# Patient Record
Sex: FEMALE | Race: WHITE | NOT HISPANIC OR LATINO | Employment: OTHER | URBAN - METROPOLITAN AREA
[De-identification: names, ages, dates, MRNs, and addresses within clinical notes are randomized per-mention and may not be internally consistent; named-entity substitution may affect disease eponyms.]

---

## 2022-06-01 ENCOUNTER — HOSPITAL ENCOUNTER (EMERGENCY)
Facility: HOSPITAL | Age: 22
Discharge: HOME | End: 2022-06-01
Admitting: PHYSICIAN ASSISTANT
Payer: OTHER GOVERNMENT

## 2022-06-01 ENCOUNTER — APPOINTMENT (OUTPATIENT)
Dept: RADIOLOGY | Facility: HOSPITAL | Age: 22
End: 2022-06-01
Attending: EMERGENCY MEDICINE
Payer: OTHER GOVERNMENT

## 2022-06-01 VITALS
HEART RATE: 100 BPM | WEIGHT: 135 LBS | SYSTOLIC BLOOD PRESSURE: 118 MMHG | HEIGHT: 62 IN | OXYGEN SATURATION: 96 % | TEMPERATURE: 98.5 F | BODY MASS INDEX: 24.84 KG/M2 | RESPIRATION RATE: 20 BRPM | DIASTOLIC BLOOD PRESSURE: 67 MMHG

## 2022-06-01 DIAGNOSIS — J06.9 VIRAL URI WITH COUGH: ICD-10-CM

## 2022-06-01 DIAGNOSIS — J45.21 MILD INTERMITTENT ASTHMA WITH EXACERBATION: ICD-10-CM

## 2022-06-01 LAB
ALBUMIN SERPL-MCNC: 4.3 G/DL (ref 3.5–5)
ALP SERPL-CCNC: 78 U/L (ref 45–120)
ALT SERPL W P-5'-P-CCNC: 16 U/L (ref 0–45)
ANION GAP SERPL CALCULATED.3IONS-SCNC: 11 MMOL/L (ref 5–18)
AST SERPL W P-5'-P-CCNC: 17 U/L (ref 0–40)
BASOPHILS # BLD AUTO: 0 10E3/UL (ref 0–0.2)
BASOPHILS NFR BLD AUTO: 0 %
BILIRUB SERPL-MCNC: 1.4 MG/DL (ref 0–1)
BUN SERPL-MCNC: 10 MG/DL (ref 8–22)
CALCIUM SERPL-MCNC: 9.6 MG/DL (ref 8.5–10.5)
CHLORIDE BLD-SCNC: 101 MMOL/L (ref 98–107)
CO2 SERPL-SCNC: 23 MMOL/L (ref 22–31)
CREAT SERPL-MCNC: 0.76 MG/DL (ref 0.6–1.1)
EOSINOPHIL # BLD AUTO: 0.4 10E3/UL (ref 0–0.7)
EOSINOPHIL NFR BLD AUTO: 3 %
ERYTHROCYTE [DISTWIDTH] IN BLOOD BY AUTOMATED COUNT: 12 % (ref 10–15)
FLUAV RNA SPEC QL NAA+PROBE: NEGATIVE
FLUBV RNA RESP QL NAA+PROBE: NEGATIVE
GFR SERPL CREATININE-BSD FRML MDRD: >90 ML/MIN/1.73M2
GLUCOSE BLD-MCNC: 100 MG/DL (ref 70–125)
HCT VFR BLD AUTO: 46.7 % (ref 35–47)
HGB BLD-MCNC: 15.5 G/DL (ref 11.7–15.7)
IMM GRANULOCYTES # BLD: 0.1 10E3/UL
IMM GRANULOCYTES NFR BLD: 0 %
LIPASE SERPL-CCNC: <9 U/L (ref 0–52)
LYMPHOCYTES # BLD AUTO: 1.2 10E3/UL (ref 0.8–5.3)
LYMPHOCYTES NFR BLD AUTO: 8 %
MCH RBC QN AUTO: 28.9 PG (ref 26.5–33)
MCHC RBC AUTO-ENTMCNC: 33.2 G/DL (ref 31.5–36.5)
MCV RBC AUTO: 87 FL (ref 78–100)
MONOCYTES # BLD AUTO: 1.5 10E3/UL (ref 0–1.3)
MONOCYTES NFR BLD AUTO: 10 %
NEUTROPHILS # BLD AUTO: 12.5 10E3/UL (ref 1.6–8.3)
NEUTROPHILS NFR BLD AUTO: 79 %
NRBC # BLD AUTO: 0 10E3/UL
NRBC BLD AUTO-RTO: 0 /100
PLATELET # BLD AUTO: 277 10E3/UL (ref 150–450)
POTASSIUM BLD-SCNC: 4.2 MMOL/L (ref 3.5–5)
PROT SERPL-MCNC: 8.5 G/DL (ref 6–8)
RBC # BLD AUTO: 5.36 10E6/UL (ref 3.8–5.2)
SARS-COV-2 RNA RESP QL NAA+PROBE: NEGATIVE
SODIUM SERPL-SCNC: 135 MMOL/L (ref 136–145)
WBC # BLD AUTO: 15.6 10E3/UL (ref 4–11)

## 2022-06-01 PROCEDURE — 83690 ASSAY OF LIPASE: CPT | Performed by: EMERGENCY MEDICINE

## 2022-06-01 PROCEDURE — 71046 X-RAY EXAM CHEST 2 VIEWS: CPT

## 2022-06-01 PROCEDURE — 87636 SARSCOV2 & INF A&B AMP PRB: CPT | Performed by: EMERGENCY MEDICINE

## 2022-06-01 PROCEDURE — 80053 COMPREHEN METABOLIC PANEL: CPT | Performed by: EMERGENCY MEDICINE

## 2022-06-01 PROCEDURE — 99284 EMERGENCY DEPT VISIT MOD MDM: CPT | Mod: CS,25

## 2022-06-01 PROCEDURE — 36415 COLL VENOUS BLD VENIPUNCTURE: CPT | Performed by: EMERGENCY MEDICINE

## 2022-06-01 PROCEDURE — 85025 COMPLETE CBC W/AUTO DIFF WBC: CPT | Performed by: EMERGENCY MEDICINE

## 2022-06-01 PROCEDURE — 82040 ASSAY OF SERUM ALBUMIN: CPT | Performed by: EMERGENCY MEDICINE

## 2022-06-01 PROCEDURE — 250N000011 HC RX IP 250 OP 636: Performed by: EMERGENCY MEDICINE

## 2022-06-01 PROCEDURE — 96361 HYDRATE IV INFUSION ADD-ON: CPT

## 2022-06-01 PROCEDURE — C9803 HOPD COVID-19 SPEC COLLECT: HCPCS

## 2022-06-01 PROCEDURE — 250N000013 HC RX MED GY IP 250 OP 250 PS 637: Performed by: EMERGENCY MEDICINE

## 2022-06-01 PROCEDURE — 96374 THER/PROPH/DIAG INJ IV PUSH: CPT

## 2022-06-01 PROCEDURE — 258N000003 HC RX IP 258 OP 636: Performed by: EMERGENCY MEDICINE

## 2022-06-01 RX ORDER — SODIUM CHLORIDE 9 MG/ML
INJECTION, SOLUTION INTRAVENOUS CONTINUOUS
Status: DISCONTINUED | OUTPATIENT
Start: 2022-06-01 | End: 2022-06-01 | Stop reason: HOSPADM

## 2022-06-01 RX ORDER — KETOROLAC TROMETHAMINE 30 MG/ML
15 INJECTION, SOLUTION INTRAMUSCULAR; INTRAVENOUS ONCE
Status: COMPLETED | OUTPATIENT
Start: 2022-06-01 | End: 2022-06-01

## 2022-06-01 RX ORDER — ACETAMINOPHEN 325 MG/1
650 TABLET ORAL ONCE
Status: COMPLETED | OUTPATIENT
Start: 2022-06-01 | End: 2022-06-01

## 2022-06-01 RX ORDER — ONDANSETRON 2 MG/ML
4 INJECTION INTRAMUSCULAR; INTRAVENOUS EVERY 30 MIN PRN
Status: DISCONTINUED | OUTPATIENT
Start: 2022-06-01 | End: 2022-06-01 | Stop reason: HOSPADM

## 2022-06-01 RX ORDER — PREDNISONE 20 MG/1
TABLET ORAL
Qty: 10 TABLET | Refills: 0 | Status: SHIPPED | OUTPATIENT
Start: 2022-06-01

## 2022-06-01 RX ADMIN — ACETAMINOPHEN 650 MG: 325 TABLET ORAL at 16:36

## 2022-06-01 RX ADMIN — SODIUM CHLORIDE 1000 ML: 9 INJECTION, SOLUTION INTRAVENOUS at 16:40

## 2022-06-01 RX ADMIN — KETOROLAC TROMETHAMINE 15 MG: 30 INJECTION, SOLUTION INTRAMUSCULAR at 16:36

## 2022-06-01 ASSESSMENT — ENCOUNTER SYMPTOMS
COUGH: 1
HEMATURIA: 0
FREQUENCY: 0
WHEEZING: 1
FEVER: 0
NAUSEA: 1
FATIGUE: 1
DYSURIA: 0
CHEST TIGHTNESS: 1
VOMITING: 1
ABDOMINAL PAIN: 0
MYALGIAS: 1
SHORTNESS OF BREATH: 1
DIARRHEA: 0
HEADACHES: 1

## 2022-06-01 NOTE — ED TRIAGE NOTES
pt c/o cold symptoms, body aches, cough, sore throat and n/v since Monday. covid test yesterday was negative

## 2022-06-01 NOTE — DISCHARGE INSTRUCTIONS
Your symptoms are most consistent with a viral infection. Viral infections do not respond to antibiotics.   The goal with viral infections is to treat the symptoms while your body fights the infection.  -Make sure you drink plenty of fluids to keep mucous thin. Use the zofran you were prescribed yesterday to ensure you are able to keep fluids down.   -Use Cepacol throat lozenges. These work better than sprays or mouth washes, as you get the medication for a longer amount of time because you are sucking on the medication. This works to numb the back of your throat. (Found over the counter)  For your cough:  -Use over the counter cough medication as needed.   -Prednisone 40 mg once daily for 3-5 days. Please take the steroid, Prednisone. Take Prednisone with food or milk to minimize stomach upset.      Side effects of Prednisone include difficulty sleeping, increased appetite, weight gain, and changes in mood.   For headaches, pain, or fevers:  -Use Ibuprofen 600 mg and Tylenol 650 mg every 8 hours as needed. Always take ibuprofen with food to prevent stomach upset.  Follow up in clinic in 5 days to make sure your symptoms are improving.  Return to the emergency department if you develop new/worsening fevers, chest pain, shortness of breath, worsening/any other concerning symptoms.

## 2022-06-01 NOTE — ED PROVIDER NOTES
EMERGENCY DEPARTMENT ENCOUNTER      NAME: Timothy Pino  AGE: 22 year old female  YOB: 2000  MRN: [id number]  EVALUATION DATE & TIME: No admission date for patient encounter.    PCP: [facility]    ED PROVIDER: Karen Stafford PA-C      CHIEF COMPLAINT:  Cough  Nausea  Vomiting    FINAL IMPRESSION:  1. Viral URI with cough    2. Mild intermittent asthma with exacerbation          MEDICAL DECISION MAKING:    Pertinent Labs & Imaging studies reviewed. (See chart for details)  22 year old female with a pertinent history of asthma presents to the Emergency Department for evaluation of cough, wheezing, chest tightness, nausea, vomiting, decreased appetite, body aches. Was seen in  yesterday and prescribed zofran. She has not vomited today. She denies fevers, chest pain, diarrhea, abdominal pain. She is fully vaccinated for covid and tested negative yesterday.     Vitals reviewed and notable for tachycardia on arrival. Normalized on recheck after IVF, suspect likely secondary to dehydration. Patient is non-toxic appearing and in no acute distress. On physical exam she has a dry cough. She has expiratory wheezing, lungs otherwise clear. Speaking in full sentences without difficulty. Abdomen is soft and non-tender. No flank or CVA tenderness. No lower extremity edema. Differential diagnosis includes viral respiratory infection including COVID, influenza or other viral syndrome, pneumonia, or reactive airway disease.     COVID and influenza negative. Patient is comfortable on room air, saturating at 96% with no respiratory distress. CXR unremarkable. Symptoms are not consistent with diagnosis of pulmonary emboli, acute MI or other emergent cardiopulmonary pathology given clear viral respiratory symptoms. She has a leukocytosis, could be demargination from vomiting. Hgb 15.5. No significant electrolyte derangements. Renal function WNL. Total bilirubin 1.4, remaining LFTs including lipase WNL.  No focal RUQ or epigastric tenderness to suggest acute hepatobiliary etiology. Given her asthma history and wheezing, will start her on a 5 day course of prednisone. She does not require albuterol refill today. Discussed supportive measures she can take at home for her symptoms. Patient expresses understanding and discharged home in stable condition after discussing return precautions.     0 minutes of critical care time     ED COURSE:  5:33 PM I met with the patient, obtained history, performed an initial exam, and discussed options and plan for diagnostics and treatment here in the ED.  5:56 PM Patient discharged after being provided with extensive anticipatory guidance and given return precautions, importance of PCP follow-up emphasized.    At the conclusion of the encounter I discussed the results of all of the tests and the disposition. The questions were answered. The patient acknowledged understanding and was agreeable with the care plan.     MEDICATIONS GIVEN IN THE EMERGENCY:  Medications   0.9% sodium chloride BOLUS (1,000 mLs Intravenous New Bag 6/1/22 1640)     Followed by   sodium chloride 0.9% infusion (has no administration in time range)   ondansetron (ZOFRAN) injection 4 mg (has no administration in time range)   acetaminophen (TYLENOL) tablet 650 mg (650 mg Oral Given 6/1/22 1636)   ketorolac (TORADOL) injection 15 mg (15 mg Intravenous Given 6/1/22 1636)       NEW PRESCRIPTIONS STARTED AT TODAY'S ER VISIT  New Prescriptions    PREDNISONE (DELTASONE) 20 MG TABLET    Take two tablets (= 40mg) each day for 5 (five) days            =================================================================    HPI:    Patient information was obtained from: Patient    Use of Interpretor: N/A      Timothy ALTAF Pino is a 22 year old female with a pertinent history of asthma, ADHD, PAIGE, depression, migraines, tonsillar hypertrophy who presents to this ED via private vehicle for evaluation of cough.    Patient reports  onset of cough, body aches, fatigue, headache on Monday, 2 days ago. Yesterday she began having nausea and vomiting. She was seen in urgent care and prescribed zofran. She denies vomiting today but has no appetite so she has not tried eating. She denies any fevers, diarrhea, abdominal pain. She reports wheezing and chest tightness with mild shortness of breath. She has been using her albuterol inhaler with improvement. She denies chance of pregnancy. She denies any dysuria, hematuria, frequency. She is fully vaccinated for covid-19. She had a negative COVID-19 test yesterday at urgent care.     REVIEW OF SYSTEMS:  Review of Systems   Constitutional: Positive for fatigue. Negative for fever.   Respiratory: Positive for cough, chest tightness, shortness of breath and wheezing.    Gastrointestinal: Positive for nausea and vomiting. Negative for abdominal pain and diarrhea.   Genitourinary: Negative for dysuria, frequency and hematuria.   Musculoskeletal: Positive for myalgias (generalized).   Skin: Negative for rash.   Neurological: Positive for headaches.   All other systems reviewed and are negative.      PAST MEDICAL HISTORY:  No past medical history on file.    PAST SURGICAL HISTORY:  No past surgical history on file.        CURRENT MEDICATIONS:      Current Facility-Administered Medications:      ondansetron (ZOFRAN) injection 4 mg, 4 mg, Intravenous, Q30 Min PRN, Dane Castro MD     [COMPLETED] 0.9% sodium chloride BOLUS, 1,000 mL, Intravenous, Once, Last Rate: 1,000 mL/hr at 06/01/22 1640, 1,000 mL at 06/01/22 1640 **FOLLOWED BY** sodium chloride 0.9% infusion, , Intravenous, Continuous, Dane Castro MD    Current Outpatient Medications:      predniSONE (DELTASONE) 20 MG tablet, Take two tablets (= 40mg) each day for 5 (five) days, Disp: 10 tablet, Rfl: 0      ALLERGIES:  Allergies   Allergen Reactions     Latex        FAMILY HISTORY:  No family history on file.    SOCIAL HISTORY:   Social History  "    Socioeconomic History     Marital status:        VITALS:  Patient Vitals for the past 24 hrs:   BP Temp Temp src Pulse Resp SpO2 Height Weight   06/01/22 1724 118/67 -- -- 100 20 96 % -- --   06/01/22 1551 137/89 98.5  F (36.9  C) Oral 119 16 95 % 1.575 m (5' 2\") 61.2 kg (135 lb)       PHYSICAL EXAM  Constitutional: Well developed, Well nourished, NAD  HENT: Normocephalic, Atraumatic, Bilateral external ears normal, Oropharynx normal, mucous membranes moist, Nose normal.   Neck: Normal range of motion, No tenderness, Supple, No stridor. No nuchal rigidity  Eyes: Conjunctiva normal, No discharge.   Respiratory: No respiratory distress, Expiratory wheezing. No crackles or rales. Speaks full sentences easily. Dry cough.  Cardiovascular: Normal heart rate, Regular rhythm, No murmurs, No rubs, No gallops. Chest wall nontender.  GI: Soft, No tenderness, No masses, No flank tenderness. No rebound or guarding.  Musculoskeletal: No edema. No cyanosis, No clubbing. Good range of motion in all major joints.  Integument: Warm, Dry, No erythema, No rash. No petechiae. No jaundice.  Neurologic: Alert & oriented x 3, Normal motor function, Normal sensory function, No focal deficits noted. Normal gait.  Psychiatric: Affect normal, Judgment normal, Mood normal. Cooperative.    LAB:  All pertinent labs reviewed and interpreted.  Recent Results (from the past 24 hour(s))   Comprehensive metabolic panel    Collection Time: 06/01/22  4:34 PM   Result Value Ref Range    Sodium 135 (L) 136 - 145 mmol/L    Potassium 4.2 3.5 - 5.0 mmol/L    Chloride 101 98 - 107 mmol/L    Carbon Dioxide (CO2) 23 22 - 31 mmol/L    Anion Gap 11 5 - 18 mmol/L    Urea Nitrogen 10 8 - 22 mg/dL    Creatinine 0.76 0.60 - 1.10 mg/dL    Calcium 9.6 8.5 - 10.5 mg/dL    Glucose 100 70 - 125 mg/dL    Alkaline Phosphatase 78 45 - 120 U/L    AST 17 0 - 40 U/L    ALT 16 0 - 45 U/L    Protein Total 8.5 (H) 6.0 - 8.0 g/dL    Albumin 4.3 3.5 - 5.0 g/dL    " Bilirubin Total 1.4 (H) 0.0 - 1.0 mg/dL    GFR Estimate >90 >60 mL/min/1.73m2   Lipase    Collection Time: 06/01/22  4:34 PM   Result Value Ref Range    Lipase <9 0 - 52 U/L   Symptomatic; Unknown Influenza A/B & SARS-CoV2 (COVID-19) Virus PCR Multiplex Nasopharyngeal    Collection Time: 06/01/22  4:34 PM    Specimen: Nasopharyngeal; Swab   Result Value Ref Range    Influenza A PCR Negative Negative    Influenza B PCR Negative Negative    SARS CoV2 PCR Negative Negative   CBC with platelets and differential    Collection Time: 06/01/22  4:34 PM   Result Value Ref Range    WBC Count 15.6 (H) 4.0 - 11.0 10e3/uL    RBC Count 5.36 (H) 3.80 - 5.20 10e6/uL    Hemoglobin 15.5 11.7 - 15.7 g/dL    Hematocrit 46.7 35.0 - 47.0 %    MCV 87 78 - 100 fL    MCH 28.9 26.5 - 33.0 pg    MCHC 33.2 31.5 - 36.5 g/dL    RDW 12.0 10.0 - 15.0 %    Platelet Count 277 150 - 450 10e3/uL    % Neutrophils 79 %    % Lymphocytes 8 %    % Monocytes 10 %    % Eosinophils 3 %    % Basophils 0 %    % Immature Granulocytes 0 %    NRBCs per 100 WBC 0 <1 /100    Absolute Neutrophils 12.5 (H) 1.6 - 8.3 10e3/uL    Absolute Lymphocytes 1.2 0.8 - 5.3 10e3/uL    Absolute Monocytes 1.5 (H) 0.0 - 1.3 10e3/uL    Absolute Eosinophils 0.4 0.0 - 0.7 10e3/uL    Absolute Basophils 0.0 0.0 - 0.2 10e3/uL    Absolute Immature Granulocytes 0.1 <=0.4 10e3/uL    Absolute NRBCs 0.0 10e3/uL         RADIOLOGY:  Reviewed all pertinent imaging. Please see official radiology report.  XR Chest 2 Views   Final Result   IMPRESSION: Lungs remain clear. No signs of pneumonia or failure. Heart and pulmonary vascularity are normal.          Karen Stafford PA-C  Emergency Medicine  [facility]  6/1/2022       Karen Stafford PA-C  06/01/22 3873

## 2022-06-01 NOTE — ED NOTES
"ED Provider In Triage Note  [facility]  Encounter Date: Jun 1, 2022    No chief complaint on file.      Brief HPI:   Timothy Pino is a 22 year old female presenting to the Emergency Department here for flulike symptoms.  She has respiratory and GI symptoms.    Brief Physical Exam:  /89   Pulse 119   Temp 98.5  F (36.9  C) (Oral)   Resp 16   Ht 1.575 m (5' 2\")   Wt 61.2 kg (135 lb)   LMP  (LMP Unknown)   SpO2 95%   BMI 24.69 kg/m    General: Non-toxic appearing, she looks unwell  HEENT: Atraumatic  Resp: No respiratory distress  Abdomen: Non-peritoneal  Neuro: Alert, oriented, answers questions appropriately  Psych: Behavior appropriate      Plan Initiated in Triage:  Orders Placed This Encounter     XR Chest 2 Views     Comprehensive metabolic panel     Lipase     Symptomatic; Unknown Influenza A/B & SARS-CoV2 (COVID-19) Virus PCR Multiplex     FOLLOWED BY Linked Order Group      0.9% sodium chloride BOLUS      sodium chloride 0.9% infusion     ondansetron (ZOFRAN) injection 4 mg     acetaminophen (TYLENOL) tablet 650 mg     ketorolac (TORADOL) injection 15 mg       PIT Dispo:   Stable for waiting room    Dane Castro MD on 6/1/2022 at 3:58 PM    Patient was evaluated by the Physician in Triage due to a limitation of available rooms in the Emergency Department. A plan of care was discussed based on the information obtained on the initial evaluation and patient was consuled to return back to the Emergency Department lobby after this initial evalutaiton until results were obtained or a room became available in the Emergency Department. Patient was counseled not to leave prior to receiving the results of their workup.     Dane Castro MD  [facility] EMERGENCY DEPARTMENT  [address] [place] [id number]  [phone]     Dane Castro MD  06/01/22 1559    "